# Patient Record
Sex: FEMALE | Race: WHITE | NOT HISPANIC OR LATINO | Employment: OTHER | URBAN - METROPOLITAN AREA
[De-identification: names, ages, dates, MRNs, and addresses within clinical notes are randomized per-mention and may not be internally consistent; named-entity substitution may affect disease eponyms.]

---

## 2017-09-30 ENCOUNTER — TELEPHONE (OUTPATIENT)
Dept: FAMILY MEDICINE | Facility: CLINIC | Age: 52
End: 2017-09-30

## 2017-09-30 DIAGNOSIS — F33.1 MAJOR DEPRESSIVE DISORDER, RECURRENT EPISODE, MODERATE (H): ICD-10-CM

## 2017-10-02 NOTE — TELEPHONE ENCOUNTER
citalopram (CELEXA) 40 MG tablet     Last Written Prescription Date: 9/16/16  Last Fill Quantity: 90, # refills: 3  Last Office Visit with [facility] primary care provider:  9/16/16        Last PHQ-9 score on record=   PHQ-9 SCORE 9/16/2016   Total Score -   Total Score 3

## 2017-10-03 RX ORDER — CITALOPRAM HYDROBROMIDE 40 MG/1
TABLET ORAL
Qty: 30 TABLET | Refills: 0 | Status: SHIPPED | OUTPATIENT
Start: 2017-10-03 | End: 2017-10-09

## 2017-10-03 NOTE — TELEPHONE ENCOUNTER
Medication is being filled for 1 time refill only due to:  Patient needs to be seen because more than one year since last OV and PHQ9.   Routing to [place] to facilitate office visit appointment.

## 2017-10-09 ENCOUNTER — OFFICE VISIT (OUTPATIENT)
Dept: FAMILY MEDICINE | Facility: CLINIC | Age: 52
End: 2017-10-09

## 2017-10-09 VITALS
TEMPERATURE: 98.6 F | DIASTOLIC BLOOD PRESSURE: 76 MMHG | HEART RATE: 69 BPM | BODY MASS INDEX: 26.67 KG/M2 | OXYGEN SATURATION: 99 % | WEIGHT: 169.9 LBS | HEIGHT: 67 IN | SYSTOLIC BLOOD PRESSURE: 118 MMHG

## 2017-10-09 DIAGNOSIS — Z13.6 ENCOUNTER FOR LIPID SCREENING FOR CARDIOVASCULAR DISEASE: ICD-10-CM

## 2017-10-09 DIAGNOSIS — E04.1 THYROID NODULE: ICD-10-CM

## 2017-10-09 DIAGNOSIS — F33.1 MAJOR DEPRESSIVE DISORDER, RECURRENT EPISODE, MODERATE (H): Primary | ICD-10-CM

## 2017-10-09 DIAGNOSIS — Z12.31 VISIT FOR SCREENING MAMMOGRAM: ICD-10-CM

## 2017-10-09 DIAGNOSIS — Z13.220 ENCOUNTER FOR LIPID SCREENING FOR CARDIOVASCULAR DISEASE: ICD-10-CM

## 2017-10-09 DIAGNOSIS — Z12.11 SCREEN FOR COLON CANCER: ICD-10-CM

## 2017-10-09 PROCEDURE — 99213 OFFICE O/P EST LOW 20 MIN: CPT | Performed by: FAMILY MEDICINE

## 2017-10-09 RX ORDER — CITALOPRAM HYDROBROMIDE 40 MG/1
TABLET ORAL
Qty: 90 TABLET | Refills: 3 | Status: SHIPPED | OUTPATIENT
Start: 2017-10-09 | End: 2018-11-14
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
## 2017-10-09 NOTE — PATIENT INSTRUCTIONS
[facility]: cancer screening to complete mammogram and colon cancer screening. Call: [phone].   Send copy of results to us for your chart.     You can decrease Celexa to 20 mg and if within 2-4 weeks you notice mood is worsening increase back to 40 mg. If mood is well controlled on 20 mg continue with 20 mg.

## 2017-10-09 NOTE — NURSING NOTE
"Chief Complaint   Patient presents with     Medication Follow-up       Initial /76 (BP Location: Right arm, Patient Position: Chair, Cuff Size: Adult Regular)  Pulse 69  Temp 98.6  F (37  C) (Oral)  Ht 1.689 m (5' 6.5\")  Wt 77.1 kg (169 lb 14.4 oz)  SpO2 99%  Breastfeeding? No  BMI 27.01 kg/m2 Estimated body mass index is 27.01 kg/(m^2) as calculated from the following:    Height as of this encounter: 1.689 m (5' 6.5\").    Weight as of this encounter: 77.1 kg (169 lb 14.4 oz).  Medication Reconciliation: complete   Brijesh Wright MA        "

## 2017-10-10 DIAGNOSIS — F33.1 MAJOR DEPRESSIVE DISORDER, RECURRENT EPISODE, MODERATE (H): ICD-10-CM

## 2017-10-10 DIAGNOSIS — Z13.6 ENCOUNTER FOR LIPID SCREENING FOR CARDIOVASCULAR DISEASE: ICD-10-CM

## 2017-10-10 DIAGNOSIS — E04.1 THYROID NODULE: ICD-10-CM

## 2017-10-10 DIAGNOSIS — Z13.220 ENCOUNTER FOR LIPID SCREENING FOR CARDIOVASCULAR DISEASE: ICD-10-CM

## 2017-10-10 LAB
CHOLEST SERPL-MCNC: 176 MG/DL
GLUCOSE SERPL-MCNC: 93 MG/DL (ref 70–99)
HDLC SERPL-MCNC: 42 MG/DL
LDLC SERPL CALC-MCNC: 120 MG/DL
NONHDLC SERPL-MCNC: 134 MG/DL
TRIGL SERPL-MCNC: 68 MG/DL
TSH SERPL DL<=0.005 MIU/L-ACNC: 0.53 MU/L (ref 0.4–4)

## 2017-10-10 PROCEDURE — 82947 ASSAY GLUCOSE BLOOD QUANT: CPT | Performed by: FAMILY MEDICINE

## 2017-10-10 PROCEDURE — 84443 ASSAY THYROID STIM HORMONE: CPT | Performed by: FAMILY MEDICINE

## 2017-10-10 PROCEDURE — 80061 LIPID PANEL: CPT | Performed by: FAMILY MEDICINE

## 2017-10-10 PROCEDURE — 36415 COLL VENOUS BLD VENIPUNCTURE: CPT | Performed by: FAMILY MEDICINE

## 2018-01-19 DIAGNOSIS — F33.1 MAJOR DEPRESSIVE DISORDER, RECURRENT EPISODE, MODERATE (H): ICD-10-CM

## 2018-01-19 RX ORDER — CITALOPRAM HYDROBROMIDE 40 MG/1
TABLET ORAL
Qty: 30 TABLET | Refills: 0 | OUTPATIENT
Start: 2018-01-19

## 2018-01-19 NOTE — TELEPHONE ENCOUNTER
"Requested Prescriptions   Pending Prescriptions Disp Refills     citalopram (CELEXA) 40 MG tablet [Pharmacy Med Name: CITALOPRAM 40MG TABLETS]  Last Written Prescription Date:  10/09/17  Last Fill Quantity: 90 tablet,  # refills: 3   Last Office Visit with [facility], [facility] or [facility] prescribing provider:  10/09/17   Future Office Visit:    30 tablet 0     Sig: TAKE ONE TABLET BY MOUTH DAILY    SSRIs Protocol Failed    1/19/2018  3:05 AM  MARLON-7 SCORE 1/2/2014 9/29/2015 9/16/2016   Total Score 7 - -   Total Score - 5 1          Failed - PHQ-9 score less than 5 in past 6 months    The PHQ-9 criteria is meant to fail. It requires a PHQ-9 score review  PHQ-9 SCORE 1/2/2014 9/29/2015 9/16/2016   Total Score 6 - -   Total Score - 3 3          Passed - Patient is age 18 or older       Passed - No active pregnancy on record       Passed - No positive pregnancy test in last 12 months       Passed - Recent (6 mo) or future visit with authorizing provider's specialty    Patient had office visit in the last 6 months or has a visit in the next 30 days with authorizing provider.  See \"Patient Info\" tab in inbasket, or \"Choose Columns\" in Meds & Orders section of the refill encounter.              "

## 2018-01-19 NOTE — TELEPHONE ENCOUNTER
Chart review shows medication was refilled 10/9/17, #90 with 3 refills and good x 1 year. Called and clarified with pharmacy. Yes, they did receive this prescription and apologize for this automated request. Ok to disregard. Request denied by RN for this reason.    Yaneli Rivas RN  [facility] Triage

## 2018-11-14 DIAGNOSIS — F33.1 MAJOR DEPRESSIVE DISORDER, RECURRENT EPISODE, MODERATE (H): ICD-10-CM

## 2018-11-14 NOTE — TELEPHONE ENCOUNTER
"Requested Prescriptions   Pending Prescriptions Disp Refills     citalopram (CELEXA) 40 MG tablet [Pharmacy Med Name: CITALOPRAM 40MG TABLETS] 90 tablet 0     Sig: TAKE 1 TABLET(40 MG) BY MOUTH DAILY    SSRIs Protocol Failed    11/14/2018 10:11 AM       Failed - PHQ-9 score less than 5 in past 6 months    Please review last PHQ-9 score.          Failed - Recent (6 mo) or future (30 days) visit within the authorizing provider's specialty    Patient had office visit in the last 6 months or has a visit in the next 30 days with authorizing provider or within the authorizing provider's specialty.  See \"Patient Info\" tab in inbasket, or \"Choose Columns\" in Meds & Orders section of the refill encounter.           Passed - Patient is age 18 or older       Passed - No active pregnancy on record       Passed - No positive pregnancy test in last 12 months        citalopram (CELEXA) 40 MG tablet  Last Written Prescription Date:  10/9/17  Last Fill Quantity: 90,  # refills: 3   Last office visit: 10/9/2017 with prescribing provider:  Dr. Tatum   Future Office Visit:      PHQ-9 SCORE 1/2/2014 9/29/2015 9/16/2016   Total Score 6 - -   Total Score - 3 3     MARLON-7 SCORE 1/2/2014 9/29/2015 9/16/2016   Total Score 7 - -   Total Score - 5 1         "

## 2018-11-19 RX ORDER — CITALOPRAM HYDROBROMIDE 40 MG/1
TABLET ORAL
Qty: 30 TABLET | Refills: 0 | Status: SHIPPED | OUTPATIENT
Start: 2018-11-19 | End: 2018-12-26

## 2018-12-26 ENCOUNTER — TELEPHONE (OUTPATIENT)
Dept: FAMILY MEDICINE | Facility: CLINIC | Age: 53
End: 2018-12-26

## 2018-12-26 DIAGNOSIS — F33.1 MAJOR DEPRESSIVE DISORDER, RECURRENT EPISODE, MODERATE (H): ICD-10-CM

## 2018-12-26 NOTE — LETTER
[address]  [address]  [phone]    December 28, 2018      Madison Cortes  [address] [place] [id number]      Dear Madison,    We recently received a call from your pharmacy requesting a refill of your medication.    A review of your chart indicates that an appointment is required with your provider.  Please call the clinic at [phone] to schedule your appointment.    We have authorized one refill of your medication to allow time for you to schedule.   If you have a history of diabetes or high cholesterol, please come in fasting for the appointment. Fasting entails nothing to eat or drink 8 hours prior to your appointment; with the exception on water. You may take your medication the day of the appointment.    Thank you,      Anita Tatum M.D.

## 2018-12-26 NOTE — TELEPHONE ENCOUNTER
"Requested Prescriptions   Pending Prescriptions Disp Refills     citalopram (CELEXA) 40 MG tablet [Pharmacy Med Name: CITALOPRAM 40MG TABLETS] 90 tablet 0     Sig: TAKE ONE TABLET BY MOUTH DAILY    SSRIs Protocol Failed - 12/26/2018 10:25 AM       Failed - PHQ-9 score less than 5 in past 6 months    Please review last PHQ-9 score.          Failed - Recent (6 mo) or future (30 days) visit within the authorizing provider's specialty    Patient had office visit in the last 6 months or has a visit in the next 30 days with authorizing provider or within the authorizing provider's specialty.  See \"Patient Info\" tab in inbasket, or \"Choose Columns\" in Meds & Orders section of the refill encounter.           Passed - Patient is age 18 or older       Passed - No active pregnancy on record       Passed - No positive pregnancy test in last 12 months        citalopram (CELEXA) 40 MG tablet  Last Written Prescription Date:  11/19/18  Last Fill Quantity: 30,  # refills: 0   Last office visit: 10/9/2017 with prescribing provider:  Dr. Tatum   Future Office Visit:      PHQ-9 score:    PHQ-9 SCORE 9/16/2016   PHQ-9 Total Score -   PHQ-9 Total Score 3             MARLON-7 SCORE 1/2/2014 9/29/2015 9/16/2016   Total Score 7 - -   Total Score - 5 1         "

## 2018-12-28 DIAGNOSIS — F33.1 MAJOR DEPRESSIVE DISORDER, RECURRENT EPISODE, MODERATE (H): ICD-10-CM

## 2018-12-28 RX ORDER — CITALOPRAM HYDROBROMIDE 40 MG/1
40 TABLET ORAL DAILY
Qty: 30 TABLET | Refills: 0 | Status: SHIPPED | OUTPATIENT
Start: 2018-12-28 | End: 2019-01-25

## 2018-12-28 NOTE — TELEPHONE ENCOUNTER
Looks like talia refill message sent via [employer] was not read.   Please send letter and I will send one further talia refill

## 2018-12-28 NOTE — TELEPHONE ENCOUNTER
"Requested Prescriptions   Pending Prescriptions Disp Refills     citalopram (CELEXA) 40 MG tablet [Pharmacy Med Name: CITALOPRAM 40MG TABLETS]  Last Written Prescription Date:  12/28/18  Last Fill Quantity: 30 tablet,  # refills: 0   Last office visit: 10/9/2017 with prescribing provider:  Dr. Tatum   Future Office Visit:     90 tablet 0     Sig: TAKE ONE TABLET BY MOUTH DAILY    SSRIs Protocol Failed - 12/28/2018  2:58 PM       Failed - PHQ-9 score less than 5 in past 6 months    Please review last PHQ-9 score.   PHQ-9 SCORE 1/2/2014 9/29/2015 9/16/2016   PHQ-9 Total Score 6 - -   PHQ-9 Total Score - 3 3     MARLON-7 SCORE 1/2/2014 9/29/2015 9/16/2016   Total Score 7 - -   Total Score - 5 1          Failed - Recent (6 mo) or future (30 days) visit within the authorizing provider's specialty    Patient had office visit in the last 6 months or has a visit in the next 30 days with authorizing provider or within the authorizing provider's specialty.  See \"Patient Info\" tab in inbasket, or \"Choose Columns\" in Meds & Orders section of the refill encounter.           Passed - Patient is age 18 or older       Passed - No active pregnancy on record       Passed - No positive pregnancy test in last 12 months          "

## 2018-12-28 NOTE — TELEPHONE ENCOUNTER
Routing refill request to provider for review/approval because:  Bess given x1 and patient did not follow up, please advise  Mouna Soria RN

## 2019-01-02 RX ORDER — CITALOPRAM HYDROBROMIDE 40 MG/1
TABLET ORAL
Qty: 90 TABLET | Refills: 0 | OUTPATIENT
Start: 2019-01-02

## 2019-01-25 ENCOUNTER — TELEPHONE (OUTPATIENT)
Dept: FAMILY MEDICINE | Facility: CLINIC | Age: 54
End: 2019-01-25

## 2019-01-25 DIAGNOSIS — F33.1 MAJOR DEPRESSIVE DISORDER, RECURRENT EPISODE, MODERATE (H): ICD-10-CM

## 2019-01-28 NOTE — TELEPHONE ENCOUNTER
"Requested Prescriptions   Pending Prescriptions Disp Refills     citalopram (CELEXA) 40 MG tablet [Pharmacy Med Name: CITALOPRAM 40MG TABLETS] 30 tablet 0     Sig: TAKE ONE TABLET BY MOUTH DAILY    SSRIs Protocol Failed - 1/25/2019  5:30 PM       Failed - PHQ-9 score less than 5 in past 6 months    Please review last PHQ-9 score.          Failed - Recent (6 mo) or future (30 days) visit within the authorizing provider's specialty    Patient had office visit in the last 6 months or has a visit in the next 30 days with authorizing provider or within the authorizing provider's specialty.  See \"Patient Info\" tab in inbasket, or \"Choose Columns\" in Meds & Orders section of the refill encounter.           Passed - Medication is active on med list       Passed - Patient is age 18 or older       Passed - No active pregnancy on record       Passed - No positive pregnancy test in last 12 months        citalopram (CELEXA) 40 MG tablet  Last Written Prescription Date:  12/28/18  Last Fill Quantity: 30,  # refills: 0   Last office visit: 10/9/2017 with prescribing provider:  Dr. Tatum   Future Office Visit:      PHQ-9 score:    PHQ-9 SCORE 9/16/2016   PHQ-9 Total Score -   PHQ-9 Total Score 3         MARLON-7 SCORE 1/2/2014 9/29/2015 9/16/2016   Total Score 7 - -   Total Score - 5 1         "

## 2019-01-29 DIAGNOSIS — F33.1 MAJOR DEPRESSIVE DISORDER, RECURRENT EPISODE, MODERATE (H): ICD-10-CM

## 2019-01-29 RX ORDER — CITALOPRAM HYDROBROMIDE 40 MG/1
40 TABLET ORAL DAILY
Qty: 15 TABLET | Refills: 0 | Status: SHIPPED | OUTPATIENT
Start: 2019-01-29 | End: 2019-02-25

## 2019-01-29 NOTE — TELEPHONE ENCOUNTER
Letter had been sent to patient last refill  Please send mychart that she is due for appt prior to further f/u  Will send 2 week talia refill

## 2019-01-29 NOTE — TELEPHONE ENCOUNTER
Routing refill request to provider for review/approval because:  Bess given x1 and patient did not follow up, please advise      Markus Cueto RN, BSN

## 2019-01-29 NOTE — TELEPHONE ENCOUNTER
"Requested Prescriptions   Pending Prescriptions Disp Refills     citalopram (CELEXA) 40 MG tablet [Pharmacy Med Name: CITALOPRAM 40MG TABLETS]  POSSIBLE DUPLICATE REQUEST. FILLED ON 1/29/19.     90 tablet 0     Sig: TAKE ONE TABLET BY MOUTH DAILY    SSRIs Protocol Failed - 1/29/2019  3:36 PM       Failed - PHQ-9 score less than 5 in past 6 months    Please review last PHQ-9 score.   PHQ-9 SCORE 1/2/2014 9/29/2015 9/16/2016   PHQ-9 Total Score 6 - -   PHQ-9 Total Score - 3 3     MARLNO-7 SCORE 1/2/2014 9/29/2015 9/16/2016   Total Score 7 - -   Total Score - 5 1              Failed - Recent (6 mo) or future (30 days) visit within the authorizing provider's specialty    Patient had office visit in the last 6 months or has a visit in the next 30 days with authorizing provider or within the authorizing provider's specialty.  See \"Patient Info\" tab in inbasket, or \"Choose Columns\" in Meds & Orders section of the refill encounter.           Passed - Medication is active on med list       Passed - Patient is age 18 or older       Passed - No active pregnancy on record       Passed - No positive pregnancy test in last 12 months          "

## 2019-02-01 RX ORDER — CITALOPRAM HYDROBROMIDE 40 MG/1
TABLET ORAL
Qty: 90 TABLET | Refills: 0 | OUTPATIENT
Start: 2019-02-01

## 2019-02-01 NOTE — TELEPHONE ENCOUNTER
Requesting 90 day fill - patient advised she needs OV before further refills from last fill on 1/29/19:  Medication Detail      Disp Refills Start End MARIAH   citalopram (CELEXA) 40 MG tablet 15 tablet 0 1/29/2019  No   Sig - Route: Take 1 tablet (40 mg) by mouth daily Office visit needed prior to further refill - Oral   Sent to pharmacy as: citalopram (CELEXA) 40 MG tablet   Class: E-Prescribe   Order: [id number]   E-Prescribing Status: Receipt confirmed by pharmacy (1/29/2019  3:30 PM CST)   Printout Tracking     External Result Report   Medication Administration Instructions     Office visit needed prior to further refill   Pharmacy     [id number]  [place], [place] - [address]     Annette Manning RN

## 2019-02-25 ENCOUNTER — OFFICE VISIT (OUTPATIENT)
Dept: FAMILY MEDICINE | Facility: CLINIC | Age: 54
End: 2019-02-25

## 2019-02-25 VITALS
BODY MASS INDEX: 24.64 KG/M2 | HEIGHT: 67 IN | RESPIRATION RATE: 16 BRPM | OXYGEN SATURATION: 99 % | TEMPERATURE: 98.8 F | DIASTOLIC BLOOD PRESSURE: 81 MMHG | HEART RATE: 62 BPM | SYSTOLIC BLOOD PRESSURE: 130 MMHG | WEIGHT: 157 LBS

## 2019-02-25 DIAGNOSIS — Z12.31 VISIT FOR SCREENING MAMMOGRAM: ICD-10-CM

## 2019-02-25 DIAGNOSIS — F33.1 MAJOR DEPRESSIVE DISORDER, RECURRENT EPISODE, MODERATE (H): Primary | ICD-10-CM

## 2019-02-25 DIAGNOSIS — Z01.419 ENCOUNTER FOR GYNECOLOGICAL EXAMINATION WITHOUT ABNORMAL FINDING: ICD-10-CM

## 2019-02-25 DIAGNOSIS — Z12.11 SCREEN FOR COLON CANCER: ICD-10-CM

## 2019-02-25 PROCEDURE — 99213 OFFICE O/P EST LOW 20 MIN: CPT | Performed by: FAMILY MEDICINE

## 2019-02-25 RX ORDER — CITALOPRAM HYDROBROMIDE 40 MG/1
40 TABLET ORAL DAILY
Qty: 90 TABLET | Refills: 3 | Status: SHIPPED | OUTPATIENT
Start: 2019-02-25 | End: 2020-03-05

## 2019-02-25 ASSESSMENT — ANXIETY QUESTIONNAIRES
GAD7 TOTAL SCORE: 0
5. BEING SO RESTLESS THAT IT IS HARD TO SIT STILL: NOT AT ALL
6. BECOMING EASILY ANNOYED OR IRRITABLE: NOT AT ALL
IF YOU CHECKED OFF ANY PROBLEMS ON THIS QUESTIONNAIRE, HOW DIFFICULT HAVE THESE PROBLEMS MADE IT FOR YOU TO DO YOUR WORK, TAKE CARE OF THINGS AT HOME, OR GET ALONG WITH OTHER PEOPLE: NOT DIFFICULT AT ALL
7. FEELING AFRAID AS IF SOMETHING AWFUL MIGHT HAPPEN: NOT AT ALL
2. NOT BEING ABLE TO STOP OR CONTROL WORRYING: NOT AT ALL
3. WORRYING TOO MUCH ABOUT DIFFERENT THINGS: NOT AT ALL
1. FEELING NERVOUS, ANXIOUS, OR ON EDGE: NOT AT ALL

## 2019-02-25 ASSESSMENT — PAIN SCALES - GENERAL: PAINLEVEL: NO PAIN (0)

## 2019-02-25 ASSESSMENT — PATIENT HEALTH QUESTIONNAIRE - PHQ9
5. POOR APPETITE OR OVEREATING: NOT AT ALL
SUM OF ALL RESPONSES TO PHQ QUESTIONS 1-9: 2

## 2019-02-25 ASSESSMENT — MIFFLIN-ST. JEOR: SCORE: 1351.84

## 2019-02-25 NOTE — PATIENT INSTRUCTIONS
Schedule a lab only visit for fasting labs. You need to be fasting for 10-12 hours. You can do this at any [facility].

## 2019-02-25 NOTE — PROGRESS NOTES
SUBJECTIVE:   Madison Cortes is a 53 year old female who presents to clinic today for the following health issues:    Depression Followup    Status since last visit: Stable     See PHQ-9 for current symptoms.  Other associated symptoms: None    Complicating factors:   Significant life event:  No   Current substance abuse:  None  Anxiety or Panic symptoms:  No    -Previously discussed reducing 40 mg dose of citalopram but patient decided not to because she has been doing well and tolerating it well, she doesn't want to risk reducing it and having more symptoms  -Sleeping well     Additional:  -No concerns about thyroid nodule, patient reports she cannot feel it anymore   -Patient has been working on eating significantly healthier over the last year    Wt Readings from Last 4 Encounters:   02/25/19 71.2 kg (157 lb)   10/09/17 77.1 kg (169 lb 14.4 oz)   09/16/16 77.7 kg (171 lb 6.4 oz)   09/29/15 77.6 kg (171 lb 1.6 oz)       PHQ 9/29/2015 9/16/2016   PHQ-9 Total Score 3 3   Q9: Suicide Ideation Not at all Not at all     PHQ-9  English  PHQ-9   Any Language  Suicide Assessment Five-step Evaluation and Treatment (SAFE-T)        Amount of exercise or physical activity: 2-3 days/week for an average of 30-45 minutes    Problems taking medications regularly: No    Medication side effects: none    Diet: low salt        Problem list and histories reviewed & adjusted, as indicated.  Additional history: as documented    Patient Active Problem List   Diagnosis     Major depressive disorder, recurrent episode, moderate (H)     Head injury     CARDIOVASCULAR SCREENING; LDL GOAL LESS THAN 160     Thyroid nodule     Memory loss     Past Surgical History:   Procedure Laterality Date     C APPENDECTOMY  5th grade     SURGICAL HISTORY OF -   1993    rectal fistula surgery after 11 # baby born       Social History     Tobacco Use     Smoking status: Never Smoker     Smokeless tobacco: Never Used   Substance Use Topics     Alcohol  "use: No     Family History   Problem Relation Age of Onset     Psychotic Disorder Daughter         bipolar     Asthma No family hx of      C.A.D. No family hx of      Diabetes No family hx of      Hypertension No family hx of      Cerebrovascular Disease No family hx of      Breast Cancer No family hx of      Cancer - colorectal No family hx of      Prostate Cancer No family hx of            Reviewed and updated as needed this visit by clinical staff  Tobacco  Allergies  Meds  Med Hx  Surg Hx  Fam Hx  Soc Hx      Reviewed and updated as needed this visit by Provider         ROS:  Constitutional, HEENT, cardiovascular, pulmonary, gi and gu systems are negative, except as otherwise noted.    This document serves as a record of the services and decisions personally performed by SVETLANA GRIMES. It was created on his/her behalf by Marissa Arndt, a trained medical scribe. The creation of this document is based on the provider's statements to the medical scribe. Marissa Arndt, February 25, 2019 4:09 PM  OBJECTIVE:     /81 (BP Location: Right arm, Patient Position: Chair, Cuff Size: Adult Regular)   Pulse 62   Temp 98.8  F (37.1  C) (Oral)   Resp 16   Ht 1.689 m (5' 6.5\")   Wt 71.2 kg (157 lb)   LMP 02/25/2019 (Exact Date)   SpO2 99%   Breastfeeding? No   BMI 24.96 kg/m    Body mass index is 24.96 kg/m .  GENERAL: healthy, alert and no distress  NECK: no adenopathy, no asymmetry, masses, or scars and thyroid normal to palpation  RESP: lungs clear to auscultation - no rales, rhonchi or wheezes  CV: regular rate and rhythm, normal S1 S2, no S3 or S4, no murmur, click or rub, no peripheral edema and peripheral pulses strong  MS: no gross musculoskeletal defects noted, no edema  PSYCH: mentation appears normal, affect normal/bright  ASSESSMENT/PLAN:     1. Major depressive disorder, recurrent episode, moderate (H)  Stable. Continue current medication   - citalopram (CELEXA) 40 MG tablet; Take 1 " tablet (40 mg) by mouth daily Office visit needed prior to further refill  Dispense: 90 tablet; Refill: 3    2. Screen for colon cancer  Discussed colonoscopy vs FIT, patient is uninsured and would prefer FIT   - Fecal colorectal cancer screen (FIT); Future    3. Visit for screening mammogram  Patient is not interested in mammogram. Again reviewed [facility] program. Discussed risks of not screening; patient understands     4. Encounter for gynecological examination without abnormal finding  Patient would like to transfer from previous ob-gyn to new provider. Starting to have some menopausal symptoms  - OB/GYN REFERRAL    Discussed Shingrix vaccine, patient will consider in the future    F/u anually      The information in this document, created by the medical scribe for me, accurately reflects the services I personally performed and the decisions made by me. I have reviewed and approved this document for accuracy.   Anita Tatum MD  [facility]

## 2019-02-26 ASSESSMENT — ANXIETY QUESTIONNAIRES: GAD7 TOTAL SCORE: 0

## 2019-03-08 DIAGNOSIS — Z12.11 SCREEN FOR COLON CANCER: ICD-10-CM

## 2019-03-08 LAB — HEMOCCULT STL QL IA: NEGATIVE

## 2019-03-08 PROCEDURE — 82274 ASSAY TEST FOR BLOOD FECAL: CPT | Performed by: FAMILY MEDICINE

## 2019-10-02 ENCOUNTER — HEALTH MAINTENANCE LETTER (OUTPATIENT)
Age: 54
End: 2019-10-02

## 2020-03-02 DIAGNOSIS — F33.1 MAJOR DEPRESSIVE DISORDER, RECURRENT EPISODE, MODERATE (H): ICD-10-CM

## 2020-03-02 NOTE — TELEPHONE ENCOUNTER
"Requested Prescriptions   Pending Prescriptions Disp Refills     citalopram (CELEXA) 40 MG tablet [Pharmacy Med Name: CITALOPRAM 40MG TABLETS] 90 tablet 3     Sig: TAKE 1 TABLET BY MOUTH DAILY       SSRIs Protocol Failed - 3/2/2020 10:12 AM        Failed - PHQ-9 score less than 5 in past 6 months     Please review last PHQ-9 score.           Failed - Recent (6 mo) or future (30 days) visit within the authorizing provider's specialty     Patient had office visit in the last 6 months or has a visit in the next 30 days with authorizing provider or within the authorizing provider's specialty.  See \"Patient Info\" tab in inbasket, or \"Choose Columns\" in Meds & Orders section of the refill encounter.            Passed - Medication is active on med list        Passed - Patient is age 18 or older        Passed - No active pregnancy on record        Passed - No positive pregnancy test in last 12 months        citalopram (CELEXA) 40 MG tablet  Last Written Prescription Date:  2/25/19  Last Fill Quantity: 90,  # refills: 3   Last office visit: 2/25/2019 with prescribing provider:  Dr. Tatum   Future Office Visit:      PHQ-9 score:    PHQ 2/25/2019   PHQ-9 Total Score 2   Q9: Thoughts of better off dead/self-harm past 2 weeks Not at all             MARLON-7 SCORE 9/29/2015 9/16/2016 2/25/2019   Total Score - - -   Total Score 5 1 0         "

## 2020-03-05 RX ORDER — CITALOPRAM HYDROBROMIDE 40 MG/1
40 TABLET ORAL DAILY
Qty: 30 TABLET | Refills: 0 | Status: SHIPPED | OUTPATIENT
Start: 2020-03-05 | End: 2020-04-03

## 2020-03-05 NOTE — TELEPHONE ENCOUNTER
PHQ-9 score:    PHQ 2/25/2019   PHQ-9 Total Score 2   Q9: Thoughts of better off dead/self-harm past 2 weeks Not at all     Routing refill request to provider for review/approval because:  Patient is due for apt and a PHQ9 update    Toshia Figueredo RN  [facility]

## 2020-03-22 ENCOUNTER — HEALTH MAINTENANCE LETTER (OUTPATIENT)
Age: 55
End: 2020-03-22

## 2020-04-03 DIAGNOSIS — F33.1 MAJOR DEPRESSIVE DISORDER, RECURRENT EPISODE, MODERATE (H): ICD-10-CM

## 2020-04-03 RX ORDER — CITALOPRAM HYDROBROMIDE 40 MG/1
TABLET ORAL
Qty: 30 TABLET | Refills: 0 | Status: SHIPPED | OUTPATIENT
Start: 2020-04-03 | End: 2020-05-08

## 2020-04-03 NOTE — TELEPHONE ENCOUNTER
"Requested Prescriptions   Pending Prescriptions Disp Refills     citalopram (CELEXA) 40 MG tablet [Pharmacy Med Name: CITALOPRAM 40MG TABLETS]  Last Written Prescription Date:  3/5/20  Last Fill Quantity: 30 tablet,  # refills: 0   Last office visit: 2/25/2019 with prescribing provider:  Dr. Tatum   Future Office Visit:     30 tablet 0     Sig: TAKE 1 TABLET(40 MG) BY MOUTH DAILY       SSRIs Protocol Failed - 4/3/2020 10:10 AM        Failed - PHQ-9 score less than 5 in past 6 months     Please review last PHQ-9 score.   PHQ 9/29/2015 9/16/2016 2/25/2019   PHQ-9 Total Score 3 3 2   Q9: Thoughts of better off dead/self-harm past 2 weeks Not at all Not at all Not at all     MARLON-7 SCORE 9/29/2015 9/16/2016 2/25/2019   Total Score - - -   Total Score 5 1 0                 Failed - Recent (6 mo) or future (30 days) visit within the authorizing provider's specialty     Patient had office visit in the last 6 months or has a visit in the next 30 days with authorizing provider or within the authorizing provider's specialty.  See \"Patient Info\" tab in inbasket, or \"Choose Columns\" in Meds & Orders section of the refill encounter.            Passed - Medication is active on med list        Passed - Patient is age 18 or older        Passed - No active pregnancy on record        Passed - No positive pregnancy test in last 12 months             "

## 2020-05-03 ENCOUNTER — TELEPHONE (OUTPATIENT)
Dept: FAMILY MEDICINE | Facility: CLINIC | Age: 55
End: 2020-05-03

## 2020-05-03 DIAGNOSIS — F33.1 MAJOR DEPRESSIVE DISORDER, RECURRENT EPISODE, MODERATE (H): ICD-10-CM

## 2020-05-03 NOTE — LETTER
[address]  [address]  [phone]    May 8, 2020      Madison Cortes  [address] [place] [id number]      Dear Madison,    We have refilled your citalopram (CELEXA) for 1 month.   We will need to see you for a virtual visit before any additional refills can be given.  Please call [phone] to schedule this appointment.      Thank you,    [facility]

## 2020-05-06 ENCOUNTER — TELEPHONE (OUTPATIENT)
Dept: FAMILY MEDICINE | Facility: CLINIC | Age: 55
End: 2020-05-06

## 2020-05-06 DIAGNOSIS — F33.1 MAJOR DEPRESSIVE DISORDER, RECURRENT EPISODE, MODERATE (H): ICD-10-CM

## 2020-05-06 NOTE — TELEPHONE ENCOUNTER
This writer attempted to contact pt on 05/06/20      Reason for call schedule virtual visit and left message.      If patient calls back:   Schedule virtual  appointment within 1 week with primary care, document that pt called and close encounter         Amy Chun MA

## 2020-05-06 NOTE — TELEPHONE ENCOUNTER
Team call and schedule the patient for a visit. Please ask if there is enough medication/supplies to last till scheduled appointment. Then route back to RN's.    Ann Lovelace RN, [facility] Triage

## 2020-05-07 NOTE — TELEPHONE ENCOUNTER
This writer attempted to contact pt on 05/07/20        Reason for call schedule virtual visit and left message.        If patient calls back:              Schedule virtual  appointment within 1 week with primary care, document that pt called and close encounter            Amy Chun MA

## 2020-05-08 RX ORDER — CITALOPRAM HYDROBROMIDE 40 MG/1
40 TABLET ORAL DAILY
Qty: 30 TABLET | Refills: 0 | Status: SHIPPED | OUTPATIENT
Start: 2020-05-08 | End: 2020-06-24

## 2020-05-08 NOTE — TELEPHONE ENCOUNTER
No appt scheduled that I can see but long over-due for visit  Virtual visit fine for med check.   Please send mychart/letter.   Will give one talia refill

## 2020-05-08 NOTE — TELEPHONE ENCOUNTER
Reminder letter to schedule needed appt for further refills mailed to pt's home address.      Stephanie MIGUEL (R))

## 2020-05-08 NOTE — TELEPHONE ENCOUNTER
Team to please call patient and schedule depression recheck appointment. Patient has not been seen in over a year. Thank you.    Markus Cueto RN, BSN, PHN

## 2020-05-11 RX ORDER — CITALOPRAM HYDROBROMIDE 40 MG/1
40 TABLET ORAL DAILY
Qty: 30 TABLET | Refills: 0 | Status: CANCELLED | OUTPATIENT
Start: 2020-05-11

## 2020-05-12 RX ORDER — CITALOPRAM HYDROBROMIDE 40 MG/1
TABLET ORAL
Qty: 30 TABLET | Refills: 0 | OUTPATIENT
Start: 2020-05-12

## 2020-05-12 NOTE — TELEPHONE ENCOUNTER
Refilled on 5/8/20 for #30 days by PCP at time of virtual visit.     Yaneli Rivas RN  [facility]

## 2020-06-17 DIAGNOSIS — F33.1 MAJOR DEPRESSIVE DISORDER, RECURRENT EPISODE, MODERATE (H): ICD-10-CM

## 2020-06-19 NOTE — TELEPHONE ENCOUNTER
Team to please call patient and schedule medication recheck appointment. Please find out if patient has enough medication to last until appointment once scheduled then route back to refill pool. Thank you.        Markus Cueto RN, BSN, PHN

## 2020-06-19 NOTE — TELEPHONE ENCOUNTER
This writer attempted to contact pt on 06/19/20      Reason for call schedule virtual visit and left message.  [employer] Back[employer] message sent    If patient calls back:   Schedule Telephone Visit appointment within 2 weeks with primary care, document that pt called and Please ask if there is enough medication/supplies to last till scheduled appointment, then route back to RN refill coleman Carrizales

## 2020-06-19 NOTE — TELEPHONE ENCOUNTER
"Requested Prescriptions   Pending Prescriptions Disp Refills     citalopram (CELEXA) 40 MG tablet [Pharmacy Med Name: CITALOPRAM 40MG TABLETS] 30 tablet 0     Sig: TAKE 1 TABLET(40 MG) BY MOUTH DAILY       SSRIs Protocol Failed - 6/19/2020  9:37 AM        Failed - PHQ-9 score less than 5 in past 6 months     Please review last PHQ-9 score.           Failed - Recent (6 mo) or future (30 days) visit within the authorizing provider's specialty     Patient had office visit in the last 6 months or has a visit in the next 30 days with authorizing provider or within the authorizing provider's specialty.  See \"Patient Info\" tab in inbasket, or \"Choose Columns\" in Meds & Orders section of the refill encounter.            Passed - Medication is active on med list        Passed - Patient is age 18 or older        Passed - No active pregnancy on record        Passed - No positive pregnancy test in last 12 months           PHQ 9/29/2015 9/16/2016 2/25/2019   PHQ-9 Total Score 3 3 2   Q9: Thoughts of better off dead/self-harm past 2 weeks Not at all Not at all Not at all       "

## 2020-06-23 NOTE — TELEPHONE ENCOUNTER
This writer attempted to contact pt on 06/23/20      Reason for call schedule virtual visit and left message.      If patient calls back:   Schedule virtual visit appointment within 1 week with primary care, document that pt called and close encounter         Amy Chun MA

## 2020-06-24 RX ORDER — CITALOPRAM HYDROBROMIDE 40 MG/1
40 TABLET ORAL DAILY
Qty: 30 TABLET | Refills: 0 | Status: SHIPPED | OUTPATIENT
Start: 2020-06-24 | End: 2020-07-28

## 2020-06-24 NOTE — TELEPHONE ENCOUNTER
LM for pt to call clinic.  3rd attempt, with no response.  Please advise.      Amy BRYANT, Patient Care

## 2020-07-24 ENCOUNTER — TELEPHONE (OUTPATIENT)
Dept: FAMILY MEDICINE | Facility: CLINIC | Age: 55
End: 2020-07-24

## 2020-07-24 DIAGNOSIS — F33.1 MAJOR DEPRESSIVE DISORDER, RECURRENT EPISODE, MODERATE (H): ICD-10-CM

## 2020-07-24 NOTE — LETTER
[address]  [address]  [phone]    July 29, 2020      Madison Cortes  [address] [place] [id number]      Dear Madison,    We have refilled your celexa for one week.   We will need to see you for a virtual visit, before any additional refills can be given.  Please call [phone] to schedule this appointment.      Thank you,    [facility]  9}

## 2020-07-28 RX ORDER — CITALOPRAM HYDROBROMIDE 40 MG/1
40 TABLET ORAL DAILY
Qty: 7 TABLET | Refills: 0 | Status: SHIPPED | OUTPATIENT
Start: 2020-07-28 | End: 2020-09-03

## 2020-07-28 NOTE — TELEPHONE ENCOUNTER
Routing refill request to provider for review/approval because:  Bess given x1 and patient did not follow up, please advise  Needs visit and update PHQ-9 questionnaire.    Yaneli Rivas RN  [facility]

## 2020-07-28 NOTE — TELEPHONE ENCOUNTER
Patient has not been reading her mychart reminders for OV.   Please send letter.   7 day talia refill sent  Virtual visit (phone/video) is ok

## 2020-09-03 ENCOUNTER — TELEPHONE (OUTPATIENT)
Dept: FAMILY MEDICINE | Facility: CLINIC | Age: 55
End: 2020-09-03

## 2020-09-03 DIAGNOSIS — F33.1 MAJOR DEPRESSIVE DISORDER, RECURRENT EPISODE, MODERATE (H): ICD-10-CM

## 2020-09-03 RX ORDER — CITALOPRAM HYDROBROMIDE 40 MG/1
40 TABLET ORAL DAILY
Qty: 30 TABLET | Refills: 0 | Status: SHIPPED | OUTPATIENT
Start: 2020-09-03 | End: 2020-09-08

## 2020-09-03 NOTE — TELEPHONE ENCOUNTER
Medication is being filled for 1 time refill only due to:  Patient needs to be seen because due for depression recheck, patient is scheduled.         Markus Cueto RN, BSN, PHN

## 2020-09-03 NOTE — TELEPHONE ENCOUNTER
Reason for Call:  Other prescription    Detailed comments: Pt states that she has a prescription that has  and is wondering if she needs an appt to refill. Please call back to advise or please refill to pharmacy attached. Thank  You.    citalopram (CELEXA) 40 MG tablet    Phone Number Patient can be reached at: Home number on file [phone] (home)    Best Time: Any    Can we leave a detailed message on this number? YES    Call taken on 9/3/2020 at 9:51 AM by Desiree Coreas

## 2020-09-03 NOTE — TELEPHONE ENCOUNTER
Team to please call patient and schedule medication recheck appointment. Once scheduled then route back to refill pool. Thank you.        Markus Cueto RN, BSN, PHN

## 2020-09-08 ENCOUNTER — VIRTUAL VISIT (OUTPATIENT)
Dept: FAMILY MEDICINE | Facility: CLINIC | Age: 55
End: 2020-09-08

## 2020-09-08 DIAGNOSIS — Z12.11 SCREEN FOR COLON CANCER: Primary | ICD-10-CM

## 2020-09-08 DIAGNOSIS — F33.1 MAJOR DEPRESSIVE DISORDER, RECURRENT EPISODE, MODERATE (H): ICD-10-CM

## 2020-09-08 PROCEDURE — 99213 OFFICE O/P EST LOW 20 MIN: CPT | Mod: 95 | Performed by: FAMILY MEDICINE

## 2020-09-08 RX ORDER — CITALOPRAM HYDROBROMIDE 40 MG/1
40 TABLET ORAL DAILY
Qty: 90 TABLET | Refills: 3 | Status: SHIPPED | OUTPATIENT
Start: 2020-09-08 | End: 2021-11-16

## 2020-09-08 NOTE — PROGRESS NOTES
"Madison Cortes is a 55 year old female who is being evaluated via a billable telephone visit.      The patient has been notified of following:     \"This telephone visit will be conducted via a call between you and your physician/provider. We have found that certain health care needs can be provided without the need for a physical exam.  This service lets us provide the care you need with a short phone conversation.  If a prescription is necessary we can send it directly to your pharmacy.  If lab work is needed we can place an order for that and you can then stop by our lab to have the test done at a later time.    Telephone visits are billed at different rates depending on your insurance coverage. During this emergency period, for some insurers they may be billed the same as an in-person visit.  Please reach out to your insurance provider with any questions.    If during the course of the call the physician/provider feels a telephone visit is not appropriate, you will not be charged for this service.\"    Patient has given verbal consent for Telephone visit?  Yes    What phone number would you like to be contacted at? [phone]    How would you like to obtain your AVS? Mail a copy    Subjective     Madison Cortes is a 55 year old female who presents via phone visit today for the following health issues:    HPI    Depression Followup    How are you doing with your depression since your last visit? No change    Are you having other symptoms that might be associated with depression? No    Have you had a significant life event?  No     Are you feeling anxious or having panic attacks?   No    Do you have any concerns with your use of alcohol or other drugs? No     Tried to drop citalopram dose top 20 mg but sx's recurred - felt more down/bad.   Back on the 40 mg dose and doing well.     Had gyn appt and has fibroids. Thinks did pap 2/2020/nl.   [facility].     Mammogram- has order through gynecologist " but has not yet scheduled.     Thyroid nodule- has not grown or changed in shape.       Social History     Tobacco Use     Smoking status: Never Smoker     Smokeless tobacco: Never Used   Substance Use Topics     Alcohol use: No     Drug use: No     PHQ 9/29/2015 9/16/2016 2/25/2019   PHQ-9 Total Score 3 3 2   Q9: Thoughts of better off dead/self-harm past 2 weeks Not at all Not at all Not at all     MARLON-7 SCORE 9/29/2015 9/16/2016 2/25/2019   Total Score - - -   Total Score 5 1 0         Suicide Assessment Five-step Evaluation and Treatment (SAFE-T)      How many servings of fruits and vegetables do you eat daily?  2-3    On average, how many sweetened beverages do you drink each day (Examples: soda, juice, sweet tea, etc.  Do NOT count diet or artificially sweetened beverages)?   1    How many days per week do you exercise enough to make your heart beat faster? 3 or less    How many minutes a day do you exercise enough to make your heart beat faster? 9 or less    How many days per week do you miss taking your medication? 0      Review of Systems   Constitutional, HEENT, cardiovascular, pulmonary, gi and gu systems are negative, except as otherwise noted.       Objective          Vitals:  No vitals were obtained today due to virtual visit.    healthy, alert and no distress  PSYCH: Alert and oriented times 3; coherent speech, normal   rate and volume, able to articulate logical thoughts, able   to abstract reason, no tangential thoughts, no hallucinations   or delusions  Her affect is normal  RESP: No cough, no audible wheezing, able to talk in full sentences  Remainder of exam unable to be completed due to telephone visits          Assessment/Plan:    Assessment & Plan     Major depressive disorder, recurrent episode, moderate (H)  Controlled, continue current meds  - citalopram (CELEXA) 40 MG tablet; Take 1 tablet (40 mg) by mouth daily Due for clinic phone or video visit prior to further refills    Screen for  colon cancer  Will have lab mail test to her.   - Fecal colorectal cancer screen (FIT); Future        Patient Instructions   Schedule medical assistant only visit for shingles and flu vaccines.     We will mail your the FIT stool test colon cancer screening     Schedule mammogram.       Return in about 1 year (around 9/8/2021).    Anita Tatum MD  [facility]    Phone call duration:  9 minutes

## 2020-09-08 NOTE — Clinical Note
Please abstract the following data from this visit with this patient into the appropriate field in Epic:    Tests that can be patient reported without a hard copy:    Pap smear done on this date: 2/2020 (approximately), by this group: [facility], results were nl.     Other Tests found in the patient's chart through Chart Review/Care Everywhere:    {Abstract Quality List (Optional):955860}    Note to Abstraction: If this section is blank, no results were found via Chart Review/Care Everywhere.

## 2020-09-08 NOTE — PATIENT INSTRUCTIONS
Schedule medical assistant only visit for shingles and flu vaccines.     We will mail your the FIT stool test colon cancer screening     Schedule mammogram.

## 2021-01-15 ENCOUNTER — HEALTH MAINTENANCE LETTER (OUTPATIENT)
Age: 56
End: 2021-01-15

## 2021-01-24 ENCOUNTER — HEALTH MAINTENANCE LETTER (OUTPATIENT)
Age: 56
End: 2021-01-24

## 2021-09-05 ENCOUNTER — HEALTH MAINTENANCE LETTER (OUTPATIENT)
Age: 56
End: 2021-09-05

## 2021-11-13 DIAGNOSIS — F33.1 MAJOR DEPRESSIVE DISORDER, RECURRENT EPISODE, MODERATE (H): ICD-10-CM

## 2021-11-16 RX ORDER — CITALOPRAM HYDROBROMIDE 40 MG/1
40 TABLET ORAL DAILY
Qty: 30 TABLET | Refills: 0 | Status: SHIPPED | OUTPATIENT
Start: 2021-11-16 | End: 2021-12-13

## 2021-12-13 DIAGNOSIS — F33.1 MAJOR DEPRESSIVE DISORDER, RECURRENT EPISODE, MODERATE (H): ICD-10-CM

## 2021-12-13 RX ORDER — CITALOPRAM HYDROBROMIDE 40 MG/1
40 TABLET ORAL DAILY
Qty: 15 TABLET | Refills: 0 | Status: SHIPPED | OUTPATIENT
Start: 2021-12-13 | End: 2022-01-14

## 2021-12-13 NOTE — TELEPHONE ENCOUNTER
"Requested Prescriptions   Pending Prescriptions Disp Refills    citalopram (CELEXA) 40 MG tablet [Pharmacy Med Name: CITALOPRAM 40MG TABLETS] 30 tablet 0     Sig: TAKE 1 TABLET(40 MG) BY MOUTH DAILY        SSRIs Protocol Failed - 12/13/2021  5:10 PM        Failed - PHQ-9 score less than 5 in past 6 months     Please review last PHQ-9 score.           Failed - Recent (6 mo) or future (30 days) visit within the authorizing provider's specialty     Patient had office visit in the last 6 months or has a visit in the next 30 days with authorizing provider or within the authorizing provider's specialty.  See \"Patient Info\" tab in inbasket, or \"Choose Columns\" in Meds & Orders section of the refill encounter.            Passed - Medication is active on med list        Passed - Patient is age 18 or older        Passed - No active pregnancy on record        Passed - No positive pregnancy test in last 12 months              "

## 2022-01-14 DIAGNOSIS — F33.1 MAJOR DEPRESSIVE DISORDER, RECURRENT EPISODE, MODERATE (H): ICD-10-CM

## 2022-01-14 RX ORDER — CITALOPRAM HYDROBROMIDE 40 MG/1
40 TABLET ORAL DAILY
Qty: 30 TABLET | Refills: 0 | Status: SHIPPED | OUTPATIENT
Start: 2022-01-14 | End: 2022-02-08

## 2022-01-14 NOTE — TELEPHONE ENCOUNTER
Pt has made an appointment for 2/8/22.  She is requesting refill of citalopram to get to ov.  Conchis COTON, RN

## 2022-02-08 ENCOUNTER — VIRTUAL VISIT (OUTPATIENT)
Dept: FAMILY MEDICINE | Facility: CLINIC | Age: 57
End: 2022-02-08

## 2022-02-08 DIAGNOSIS — Z23 IMMUNIZATION DUE: ICD-10-CM

## 2022-02-08 DIAGNOSIS — F33.1 MAJOR DEPRESSIVE DISORDER, RECURRENT EPISODE, MODERATE (H): Primary | ICD-10-CM

## 2022-02-08 DIAGNOSIS — Z12.11 SCREEN FOR COLON CANCER: ICD-10-CM

## 2022-02-08 DIAGNOSIS — Z12.31 ENCOUNTER FOR SCREENING MAMMOGRAM FOR BREAST CANCER: ICD-10-CM

## 2022-02-08 PROCEDURE — 99213 OFFICE O/P EST LOW 20 MIN: CPT | Mod: 95 | Performed by: FAMILY MEDICINE

## 2022-02-08 RX ORDER — CITALOPRAM HYDROBROMIDE 40 MG/1
40 TABLET ORAL DAILY
Qty: 90 TABLET | Refills: 3 | Status: SHIPPED | OUTPATIENT
Start: 2022-02-08 | End: 2023-02-13

## 2022-02-08 ASSESSMENT — PATIENT HEALTH QUESTIONNAIRE - PHQ9: SUM OF ALL RESPONSES TO PHQ QUESTIONS 1-9: 1

## 2022-02-08 NOTE — PATIENT INSTRUCTIONS
I would recommend Colon cancer screening, mammogram and updating your vaccinations. (check with the [place] Dept of Health on free screening programs).     Vaccines due: COVID-19 booster, Tetanus, influenza

## 2022-02-08 NOTE — PROGRESS NOTES
Madison is a 56 year old who is being evaluated via a billable telephone visit.      What phone number would you like to be contacted at? [phone]    How would you like to obtain your AVS? Mail a copy    Assessment & Plan     Major depressive disorder, recurrent episode, moderate (H)  Stable on current meds. Continue citalopram  - citalopram (CELEXA) 40 MG tablet; Take 1 tablet (40 mg) by mouth daily    Screen for colon cancer  Encounter for screening mammogram for breast cancer  Declines. Reviewed possible options through [place] [facility]. Reviewed reasons for screening. Offered assistance from our care coordinators for insurance/cost and she declines.     Immunization due  Reviewed vaccinations due.     rec in-person exam when she is able.   At minimum needs visit once yearly           Patient Instructions   I would recommend Colon cancer screening, mammogram and updating your vaccinations. (check with the [facility] on free screening programs).     Vaccines due: COVID-19 booster, Tetanus, influenza          Return in about 1 year (around 2/8/2023) for Routine preventive, med check.    Anita Tatum MD  [facility]   Madison is a 56 year old who presents for the following health issues     HPI     Virtual visit today for a med check.     Mood is good.   Sleep is good.   No anxiety.   Taking med consistently.   Wanting to stay on current dosing.     Has not gotten a mammogram.  Declines colon cancer screening/FIT test/mammogram  She does not have insurance coverage            Objective           Vitals:  No vitals were obtained today due to virtual visit.    Physical Exam   healthy, alert and no distress  PSYCH: Alert and oriented times 3; coherent speech, normal   rate and volume, able to articulate logical thoughts, able   to abstract reason, no tangential thoughts, no hallucinations   or delusions  Her affect is normal  RESP: No cough, no audible  wheezing, able to talk in full sentences  Remainder of exam unable to be completed due to telephone visits    PHQ-9 SCORE 9/16/2016 2/25/2019 2/8/2022   PHQ-9 Total Score - - -   PHQ-9 Total Score 3 2 1     MARLON-7 SCORE 9/29/2015 9/16/2016 2/25/2019   Total Score - - -   Total Score 5 1 0                      Phone call duration: 11 minutes

## 2022-02-19 ENCOUNTER — HEALTH MAINTENANCE LETTER (OUTPATIENT)
Age: 57
End: 2022-02-19

## 2022-10-22 ENCOUNTER — HEALTH MAINTENANCE LETTER (OUTPATIENT)
Age: 57
End: 2022-10-22

## 2023-04-01 ENCOUNTER — HEALTH MAINTENANCE LETTER (OUTPATIENT)
Age: 58
End: 2023-04-01

## 2024-03-08 DIAGNOSIS — F33.1 MAJOR DEPRESSIVE DISORDER, RECURRENT EPISODE, MODERATE (H): ICD-10-CM

## 2024-03-11 RX ORDER — CITALOPRAM HYDROBROMIDE 40 MG/1
TABLET ORAL
Qty: 30 TABLET | Refills: 0 | OUTPATIENT
Start: 2024-03-11

## 2024-03-11 NOTE — TELEPHONE ENCOUNTER
Declined. Last seen 2 years ago.   Last Rx one year ago for 30 days.   Needs appt for further refill

## 2024-03-13 ENCOUNTER — MYC MEDICAL ADVICE (OUTPATIENT)
Dept: FAMILY MEDICINE | Facility: CLINIC | Age: 59
End: 2024-03-13

## 2024-03-13 ENCOUNTER — VIRTUAL VISIT (OUTPATIENT)
Dept: FAMILY MEDICINE | Facility: CLINIC | Age: 59
End: 2024-03-13

## 2024-03-13 DIAGNOSIS — F33.1 MAJOR DEPRESSIVE DISORDER, RECURRENT EPISODE, MODERATE (H): Primary | ICD-10-CM

## 2024-03-13 PROCEDURE — 99213 OFFICE O/P EST LOW 20 MIN: CPT | Mod: 95 | Performed by: INTERNAL MEDICINE

## 2024-03-13 RX ORDER — CITALOPRAM HYDROBROMIDE 20 MG/1
20 TABLET ORAL DAILY
Qty: 90 TABLET | Refills: 3 | Status: SHIPPED | OUTPATIENT
Start: 2024-03-13

## 2024-03-13 RX ORDER — CITALOPRAM HYDROBROMIDE 40 MG/1
40 TABLET ORAL DAILY
Qty: 30 TABLET | Refills: 0 | Status: CANCELLED | OUTPATIENT
Start: 2024-03-13

## 2024-03-13 ASSESSMENT — PATIENT HEALTH QUESTIONNAIRE - PHQ9
10. IF YOU CHECKED OFF ANY PROBLEMS, HOW DIFFICULT HAVE THESE PROBLEMS MADE IT FOR YOU TO DO YOUR WORK, TAKE CARE OF THINGS AT HOME, OR GET ALONG WITH OTHER PEOPLE: NOT DIFFICULT AT ALL
SUM OF ALL RESPONSES TO PHQ QUESTIONS 1-9: 0
SUM OF ALL RESPONSES TO PHQ QUESTIONS 1-9: 0

## 2024-03-13 NOTE — PROGRESS NOTES
"    Instructions Relayed to Patient by Virtual Roomer:     Patient is active on [employer]:   Relayed following to patient: \"It looks like you are active on [employer], are you able to join the visit this way? If not, do you need us to send you a link now or would you like your provider to send a link via text or email when they are ready to initiate the visit?\"    Reminded patient to ensure they were logged on to virtual visit by arrival time listed. Documented in appointment notes if patient had flexibility to initiate visit sooner than arrival time. If pediatric virtual visit, ensured pediatric patient along with parent/guardian will be present for video visit.     Patient offered the website [email or web address] and/or phone number to [employer] Help line: [phone]   Vicky is a 58 year old who is being evaluated via a billable video visit.          Assessment & Plan     Major depressive disorder, recurrent episode, moderate (H)  She was on Celexa 40 mg before  Currently she is only taking 20 mg  She reduced the dose on her own  No side effects from Celexa  PHQ-9 score is 0 today  I will refill the medication for 1 more year  Also advised her to schedule an annual wellness visit to discuss about mammogram/colonoscopy/Pap smears  - citalopram (CELEXA) 20 MG tablet; Take 1 tablet (20 mg) by mouth daily      15 minutes spent by me on the date of the encounter doing chart review, history and exam, documentation and further activities per the note            Subjective   Vicky is a 58 year old, presenting for the following health issues:  No chief complaint on file.      3/13/2024     1:35 PM   Additional Questions   Roomed by Alexandria BRYANT   Accompanied by self         3/13/2024     1:35 PM   Patient Reported Additional Medications   Patient reports taking the following new medications None     Video Start Time: 5:15 PM    History of Present Illness       Reason for visit:  Medication Refill    She eats 0-1 " servings of fruits and vegetables daily.She consumes 0 sweetened beverage(s) daily.She exercises with enough effort to increase her heart rate 30 to 60 minutes per day.  She exercises with enough effort to increase her heart rate 3 or less days per week.   She is taking medications regularly.       Medication Followup of Citalopram   Taking Medication as prescribed: yes patient is taking 1/2 a pill   Side Effects:  None  Medication Helping Symptoms:  yes        Review of Systems  Constitutional, HEENT, cardiovascular, pulmonary, gi and gu systems are negative, except as otherwise noted.      Objective           Vitals:  No vitals were obtained today due to virtual visit.    Physical Exam   GENERAL: alert and no distress  EYES: Eyes grossly normal to inspection.  No discharge or erythema, or obvious scleral/conjunctival abnormalities.  RESP: No audible wheeze, cough, or visible cyanosis.    SKIN: Visible skin clear. No significant rash, abnormal pigmentation or lesions.  NEURO: Cranial nerves grossly intact.  Mentation and speech appropriate for age.  PSYCH: Appropriate affect, tone, and pace of words          Video-Visit Details    Type of service:  Video Visit   Video End Time:5:30 PM  Originating Location (pt. Location): Home    Distant Location (provider location):  Off-site  Platform used for Video Visit: Dalton  Signed Electronically by: Ceht Eastman MD

## 2024-03-24 ENCOUNTER — HEALTH MAINTENANCE LETTER (OUTPATIENT)
Age: 59
End: 2024-03-24

## 2024-04-24 NOTE — TELEPHONE ENCOUNTER
Patient Quality Outreach    Patient is due for the following:   Colon Cancer Screening  Breast Cancer Screening - Mammogram  Cervical Cancer Screening - PAP Needed  Physical Preventive Adult Physical      Topic Date Due    Hepatitis B Vaccine (1 of 3 - 19+ 3-dose series) Never done    Diptheria Tetanus Pertussis (DTAP/TDAP/TD) Vaccine (2 - Td or Tdap) 11/21/2021    Flu Vaccine (1) 09/01/2023    COVID-19 Vaccine (4 - 2023-24 season) 09/01/2023       Next Steps:   Schedule a Adult Preventative    Type of outreach:    Sent [employer] message.      Questions for provider review:    None           Keena Juárez

## 2024-06-02 ENCOUNTER — HEALTH MAINTENANCE LETTER (OUTPATIENT)
Age: 59
End: 2024-06-02

## 2024-08-14 ENCOUNTER — TELEPHONE (OUTPATIENT)
Dept: FAMILY MEDICINE | Facility: CLINIC | Age: 59
End: 2024-08-14

## 2024-08-14 NOTE — TELEPHONE ENCOUNTER
Patient Quality Outreach    Patient is due for the following:   Colon Cancer Screening  Breast Cancer Screening - Mammogram  Cervical Cancer Screening - PAP Needed  Depression  -  PHQ-9 needed  Physical Preventive Adult Physical      Topic Date Due    Hepatitis B Vaccine (1 of 3 - 19+ 3-dose series) Never done    Diptheria Tetanus Pertussis (DTAP/TDAP/TD) Vaccine (2 - Td or Tdap) 11/21/2021    COVID-19 Vaccine (4 - 2023-24 season) 09/01/2023       Next Steps:   Schedule a Adult Preventative    Type of outreach:    Sent Blizuu message.      Questions for provider review:    None           Keena Juárez

## 2025-01-08 ENCOUNTER — TELEPHONE (OUTPATIENT)
Dept: FAMILY MEDICINE | Facility: CLINIC | Age: 60
End: 2025-01-08

## 2025-01-08 NOTE — TELEPHONE ENCOUNTER
Patient Quality Outreach    Patient is due for the following:   Colon Cancer Screening  Breast Cancer Screening - Mammogram  Cervical Cancer Screening - PAP Needed  Physical Preventive Adult Physical      Topic Date Due    Hepatitis B Vaccine (1 of 3 - 19+ 3-dose series) Never done    Pneumococcal Vaccine (1 of 1 - PCV) Never done    Diptheria Tetanus Pertussis (DTAP/TDAP/TD) Vaccine (2 - Td or Tdap) 11/21/2021    Flu Vaccine (1) 09/01/2024    COVID-19 Vaccine (4 - 2024-25 season) 09/01/2024       Action(s) Taken:   Schedule a Adult Preventative    Type of outreach:    Sent [employer] message.    Questions for provider review:    None           Keena Juárez

## 2025-03-09 DIAGNOSIS — F33.1 MAJOR DEPRESSIVE DISORDER, RECURRENT EPISODE, MODERATE (H): ICD-10-CM

## 2025-03-10 RX ORDER — CITALOPRAM HYDROBROMIDE 20 MG/1
20 TABLET ORAL DAILY
Qty: 90 TABLET | Refills: 0 | Status: SHIPPED | OUTPATIENT
Start: 2025-03-10

## 2025-06-07 DIAGNOSIS — F33.1 MAJOR DEPRESSIVE DISORDER, RECURRENT EPISODE, MODERATE (H): ICD-10-CM

## 2025-06-09 RX ORDER — CITALOPRAM HYDROBROMIDE 20 MG/1
20 TABLET ORAL DAILY
Qty: 90 TABLET | Refills: 0 | Status: SHIPPED | OUTPATIENT
Start: 2025-06-09

## 2025-06-09 NOTE — TELEPHONE ENCOUNTER
I have not seen her since 2022.  Will route to Dr. Eastman who saw her last year.  Likely needs appointment.

## 2025-06-15 ENCOUNTER — HEALTH MAINTENANCE LETTER (OUTPATIENT)
Age: 60
End: 2025-06-15

## 2025-09-04 DIAGNOSIS — F33.1 MAJOR DEPRESSIVE DISORDER, RECURRENT EPISODE, MODERATE (H): ICD-10-CM

## 2025-09-04 RX ORDER — CITALOPRAM HYDROBROMIDE 20 MG/1
20 TABLET ORAL DAILY
Qty: 30 TABLET | Refills: 0 | Status: SHIPPED | OUTPATIENT
Start: 2025-09-04